# Patient Record
Sex: FEMALE | Race: BLACK OR AFRICAN AMERICAN | NOT HISPANIC OR LATINO | URBAN - METROPOLITAN AREA
[De-identification: names, ages, dates, MRNs, and addresses within clinical notes are randomized per-mention and may not be internally consistent; named-entity substitution may affect disease eponyms.]

---

## 2022-02-08 ENCOUNTER — CLAIMS CREATED FROM THE CLAIM WINDOW (OUTPATIENT)
Dept: URBAN - METROPOLITAN AREA CLINIC 48 | Facility: CLINIC | Age: 52
End: 2022-02-08
Payer: COMMERCIAL

## 2022-02-08 ENCOUNTER — LAB OUTSIDE AN ENCOUNTER (OUTPATIENT)
Dept: URBAN - METROPOLITAN AREA CLINIC 48 | Facility: CLINIC | Age: 52
End: 2022-02-08

## 2022-02-08 ENCOUNTER — DASHBOARD ENCOUNTERS (OUTPATIENT)
Age: 52
End: 2022-02-08

## 2022-02-08 VITALS
DIASTOLIC BLOOD PRESSURE: 80 MMHG | HEIGHT: 63 IN | TEMPERATURE: 97.9 F | SYSTOLIC BLOOD PRESSURE: 133 MMHG | WEIGHT: 173 LBS | BODY MASS INDEX: 30.65 KG/M2 | HEART RATE: 66 BPM

## 2022-02-08 DIAGNOSIS — K59.01 SLOW TRANSIT CONSTIPATION: ICD-10-CM

## 2022-02-08 DIAGNOSIS — Z12.11 COLON CANCER SCREENING: ICD-10-CM

## 2022-02-08 PROBLEM — 35298007: Status: ACTIVE | Noted: 2022-02-08

## 2022-02-08 PROCEDURE — 99204 OFFICE O/P NEW MOD 45 MIN: CPT | Performed by: INTERNAL MEDICINE

## 2022-02-08 PROCEDURE — 99204 OFFICE O/P NEW MOD 45 MIN: CPT | Performed by: NURSE PRACTITIONER

## 2022-02-08 RX ORDER — LINACLOTIDE 290 UG/1
1 CAPSULE AT LEAST 30 MINUTES BEFORE THE FIRST MEAL OF THE DAY ON AN EMPTY STOMACH CAPSULE, GELATIN COATED ORAL ONCE A DAY
Qty: 30 | Refills: 4 | OUTPATIENT

## 2022-02-08 NOTE — HPI-TODAY'S VISIT:
The patient was referred by Dr. Goldie Velasco for a colon consult .   A copy of this document is being forwarded to the referring provider. The patient has had intermiittent constipation for years. She has used Miralax daily and it does not work. The patient uses Smooth Move tea. Denies blood in stools or black stools. Her weight has been steady and she denies abdominal pain. States last colonoscopy was 6 years ago and was told to come back in 5 years. Denies family history of GI disease.

## 2022-02-10 PROBLEM — 305058001: Status: ACTIVE | Noted: 2022-02-08

## 2022-02-28 ENCOUNTER — OFFICE VISIT (OUTPATIENT)
Dept: URBAN - METROPOLITAN AREA SURGERY CENTER 28 | Facility: SURGERY CENTER | Age: 52
End: 2022-02-28
Payer: COMMERCIAL

## 2022-02-28 DIAGNOSIS — D12.3 ADENOMA OF TRANSVERSE COLON: ICD-10-CM

## 2022-02-28 DIAGNOSIS — K64.1 BLEEDING GRADE II HEMORRHOIDS: ICD-10-CM

## 2022-02-28 PROCEDURE — G8907 PT DOC NO EVENTS ON DISCHARG: HCPCS | Performed by: INTERNAL MEDICINE

## 2022-02-28 PROCEDURE — 45380 COLONOSCOPY AND BIOPSY: CPT | Performed by: INTERNAL MEDICINE

## 2022-02-28 RX ORDER — LINACLOTIDE 290 UG/1
1 CAPSULE AT LEAST 30 MINUTES BEFORE THE FIRST MEAL OF THE DAY ON AN EMPTY STOMACH CAPSULE, GELATIN COATED ORAL ONCE A DAY
Qty: 30 | Refills: 4 | Status: ACTIVE | COMMUNITY

## 2022-09-26 ENCOUNTER — ERX REFILL RESPONSE (OUTPATIENT)
Dept: URBAN - METROPOLITAN AREA CLINIC 44 | Facility: CLINIC | Age: 52
End: 2022-09-26

## 2022-09-26 RX ORDER — LINACLOTIDE 290 UG/1
1 CAPSULE AT LEAST 30 MINUTES BEFORE THE FIRST MEAL OF THE DAY ON AN EMPTY STOMACH CAPSULE, GELATIN COATED ORAL ONCE A DAY
Qty: 30 | Refills: 4 | OUTPATIENT

## 2022-09-26 RX ORDER — LINACLOTIDE 290 UG/1
TAKE 1 CAPSULE BY MOUTH EVERY DAY AT LEAST 30 MINUTES BEFORE THE FIRST MAIN MEAL OF THE DAY ON EMPTY STOMACH FOR 30 DAYS CAPSULE, GELATIN COATED ORAL
Qty: 30 CAPSULE | Refills: 4 | OUTPATIENT

## 2023-03-06 ENCOUNTER — ERX REFILL RESPONSE (OUTPATIENT)
Dept: URBAN - METROPOLITAN AREA CLINIC 44 | Facility: CLINIC | Age: 53
End: 2023-03-06

## 2023-03-06 RX ORDER — LINACLOTIDE 290 UG/1
TAKE 1 CAPSULE BY MOUTH EVERY DAY AT LEAST 30 MINUTES BEFORE THE FIRST MAIN MEAL OF THE DAY ON EMPTY STOMACH FOR 30 DAYS CAPSULE, GELATIN COATED ORAL
Qty: 30 CAPSULE | Refills: 4 | OUTPATIENT